# Patient Record
Sex: FEMALE | Race: OTHER | URBAN - METROPOLITAN AREA
[De-identification: names, ages, dates, MRNs, and addresses within clinical notes are randomized per-mention and may not be internally consistent; named-entity substitution may affect disease eponyms.]

---

## 2018-07-21 ENCOUNTER — HOSPITAL ENCOUNTER (OUTPATIENT)
Dept: LAB | Age: 23
Discharge: HOME | End: 2018-07-21

## 2018-07-21 ENCOUNTER — OFFICE VISIT (OUTPATIENT)
Dept: FAMILY MEDICINE CLINIC | Age: 23
End: 2018-07-21

## 2018-07-21 VITALS
HEART RATE: 64 BPM | SYSTOLIC BLOOD PRESSURE: 110 MMHG | BODY MASS INDEX: 20.55 KG/M2 | WEIGHT: 116 LBS | TEMPERATURE: 98.5 F | HEIGHT: 63 IN | DIASTOLIC BLOOD PRESSURE: 60 MMHG

## 2018-07-21 DIAGNOSIS — Z32.00 POSSIBLE PREGNANCY, NOT CONFIRMED: Primary | ICD-10-CM

## 2018-07-21 DIAGNOSIS — Z32.00 POSSIBLE PREGNANCY, NOT CONFIRMED: ICD-10-CM

## 2018-07-21 DIAGNOSIS — R51.9 DAILY HEADACHE: ICD-10-CM

## 2018-07-21 PROCEDURE — 84702 CHORIONIC GONADOTROPIN TEST: CPT | Performed by: PHYSICIAN ASSISTANT

## 2018-07-21 PROCEDURE — 84703 CHORIONIC GONADOTROPIN ASSAY: CPT | Performed by: PHYSICIAN ASSISTANT

## 2018-07-21 NOTE — PROGRESS NOTES
Assessment/Plan:    Diagnoses and all orders for this visit:    1. Possible pregnancy, not confirmed  -     BETA HCG, QT; Future  -     HCG QL SERUM; Future    2. Daily headache    No rx recommended until I review the lab results today- she states that with her first pregnancy she never had a pos UA HCG only serum HCG was positive  If her hcg is neg, then can do migraine medication  If her hcg is positive, then refer her to [facility] and start PNV  Go to ER if sxs worsen as it is Saturday so I can't get an ultrasound or STAT labs at the mobile clinic, she is smiling and looks well     Follow-up Disposition:  Return for pap appt . Ananda Knott PA-C  [address] expressed understanding of this plan. An AVS was printed and given to the patient.      ----------------------------------------------------------------------    Chief Complaint   Patient presents with    Headache     x2 months    Abdominal Pain       History of Present Illness:   here for possible early pregnancy due to fatigue, breast soreness and pelvic pressure. She has same partner currently as with her 2 yo son. She uses some form of depo that she buys at [employer], she last had it injected about 6 weeks ago. She had her first pregnancy while using similar medication. She has irregular periods and thinks that she had one this month but isn't sure  She does not have any vaginal discharge or pain with intercourse  She has daily headaches for the past 10 days. Noise really bothers her and she has to go away from everyone to lie down. Light does not bother her. Not having any serious nausea or vomiting with the headaches. She has not tried any medication. Before the past 10 days, she has had these headaches but they were usually once weekly    She denies any new stress in her life like new job or break up with partner etc       No past medical history on file.         Allergies no known allergies    Social History   Substance Use Topics    Smoking status: Never Smoker    Smokeless tobacco: Never Used    Alcohol use No       No family history on file. Physical Exam:     Visit Vitals    /60 (BP 1 Location: Right arm)    Pulse 64    Temp 98.5 °F (36.9 °C) (Oral)    Ht 5' 3.39\" (1.61 m)    Wt 116 lb (52.6 kg)    LMP 06/27/2018    BMI 20.3 kg/m2       A&Ox3  WDWN NAD  Respirations normal and non labored  Neuro CN 2-12 grossly intact  Pelvic exam bimanual only- no abnl lesions or discharge encountered. No CMT, no enlargement of her uterus.  Adnexal do not feel to be enlarged

## 2018-07-21 NOTE — PROGRESS NOTES
Patient seen for discharge with assistance from Emily Perkins, interpreter. We reviewed the AVS and the patient will go now to registration to schedule a PAP exam appointment. She will then wait in the gym to be called to the [facility] for labs.  Bebe Carroll RN

## 2018-07-21 NOTE — PATIENT INSTRUCTIONS
Dolor de edouard: Instrucciones de cuidado - [ Headache: Care Instructions ]  Instrucciones de cuidado    Los joe de edouard tienen muchas causas posibles. La mayoría de los joe de edouard no son señal de un problema más toribio y mejoran por sí solos. El tratamiento en el hogar podría ayudarlo a sentirse mejor con Rick Francis. El médico lo conway revisado minuciosamente, sean puede desarrollar problemas más tarde. Si nota algún problema o síntomas, busque tratamiento médico inmediatamente. La atención de seguimiento es elicia parte clave de estrada tratamiento y seguridad. Asegúrese de hacer y acudir a todas las citas, y llame a estrada médico si está teniendo problemas. También es elicia buena idea saber los resultados de lakisha exámenes y mantener elicia lista de los medicamentos que alyssa. ¿Cómo puede cuidarse en el [facility]? · No conduzca si ha tomado analgésicos (medicamentos para el dolor) recetados. · Descanse en un cuarto tranquilo y oscuro hasta que desaparezca el dolor de [place]. Cierre los ojos y trate de relajarse o dormirse. No bonnie la televisión ni fransisca. · Colóquese un paño frío y húmedo o Myrla Avani compresa fría sobre la yuan adolorida de 10 a 21 minutos cada vez. Póngase un paño patterson entre la compresa fría y la piel. · Utilice elicia toalla húmeda tibia o elicia almohadilla térmica ajustada a baja temperatura para relajar los músculos tensos del kia y los hombros.  · [place]ídale a alguien que le mike masajes suaves en el kia y los hombros.  · [place] los analgésicos exactamente barbra le fueron indicados. ¨ Si el médico le recetó un analgésico, tómelo según las indicaciones. ¨ Si no está tomando un analgésico recetado, pregúntele a estrada médico si puede bree juliet de [employer]. · Tenga cuidado de no bree analgésicos con mayor frecuencia que la permitida en las indicaciones porque los joe de edouard podrían empeorar o aparecer con mayor frecuencia elicia vez que el medicamento pierda estrada [place].   · Preste atención a los nuevos síntomas que aparecen con el dolor de [place], [place], debilidad o entumecimiento, cambios en la visión o confusión. Podrían ser señales de un problema más grave. Para prevenir los joe de edouard  · [employer] un diario de lakisha joe de edouard para que pueda averiguar qué los desencadena. Evitar los desencadenantes podría ayudar a prevenir los joe de [place]. Anote cuándo empieza cada dolor de [place], cuánto dura y cómo es el dolor (palpitante, juan, punzante o sordo). Anote cualquier otro síntoma que haya tenido con el dolor de [place], [place] náuseas, destellos de maria victoria o MONSERRAT, o sensibilidad a la maria victoria brillante o a los ruidos lubna. Anote si el dolor de edouard ocurrió cerca de estrada menstruación. Enumere todos los factores que pudieran claudio desencadenado el dolor de [place], barbra ciertos alimentos (chocolate, queso, vino) u olores, humo, luces brillantes, estrés o falta de sueño. · Encuentre maneras saludables de [facility]. Los joe de [place] son más comunes aleksandar o daryl después de un momento estresante. Tómese un tiempo para relajarse antes y después de hacer algo que le haya causado un dolor de edouard en el pasado. · Trate de mantener lakisha músculos relajados mediante elicia buena postura. Revise si tiene [employer] de la [place], la leslie, el kia y los hombros y trate de relajarlos. Cuando se siente en un escritorio, cambie de posición con frecuencia y estírese por 27 segundos cada hora. · Selina suficiente ejercicio y duerma bastante. · Coma en forma regular y merle. Largos períodos sin comer pueden provocar un dolor de edouard. · Regálese un masaje. Algunas personas encuentran que los masajes hechos con regularidad son [place] para aliviar la tensión. · Limite la cafeína. No hiale demasiado café, té ni sodas. Ralph no deje de consumir cafeína de repente, porque eso también puede provocarle joe de [place].   · Reduzca la tensión en los ojos a causa de la computadora parpadeando con frecuencia y apartando la mirada de la pantalla a menudo. Asegúrese de tener lentes adecuados y de que estrada monitor esté colocado de manera correcta, barbra a un brazo de distancia. · Busque ayuda si tiene depresión o ansiedad. Phyllis joe de [place] podrían relacionarse con estas afecciones. El tratamiento puede prevenir los joe de [place] y ayudar con los síntomas de ansiedad o depresión. ¿Cuándo debe pedir ayuda? Llame al 911 en cualquier momento que piense que puede necesitar atención de emergencia. Por ejemplo, llame si:    · Tiene señales de un ataque cerebral. Estas pueden incluir:  ¨ Parálisis, entumecimiento o debilidad repentinos en la leslie, el brazo o la pierna, sobre todo si ocurre en un solo lado del cuerpo. ¨ Cambios repentinos en la visión. ¨ Dificultades repentinas para hablar. ¨ Confusión repentina o dificultad para comprender frases sencillas. ¨ Problemas repentinos para caminar o mantener el equilibrio. ¨ Dolor de [place] intenso y repentino, distinto de los joe de edouard anteriores.    Llame a estrada médico ahora mismo o busque atención médica inmediata si:    · Tiene un dolor de Donnis Barrow o peor.     · Estrada dolor de edouard empeora mucho. ¿Dónde puede encontrar más información en inglés? Magdiel Marilee a [email or web address]-roverto.info/. Escriba D787 en la búsqueda para aprender más acerca de \"Dolor de eoduard: Instrucciones de cuidado - [ Headache: Care Instructions ]. \"  Revisado: 9 octubre, 2017  Versión del contenido: 11.7  © 8658-4095 [employer]. Las instrucciones de cuidado fueron adaptadas bajo licencia por [employer] (which disclaims liability or warranty for this information). Si usted tiene [place] afección médica o sobre estas instrucciones, siempre pregunte a estrada profesional de jose m. [employer], [employer] niega toda garantía o responsabilidad por estrada uso de esta información.

## 2018-07-22 LAB — HCG SERPL-ACNC: <1 MIU/ML (ref 0–6)

## 2018-07-30 ENCOUNTER — DOCUMENTATION ONLY (OUTPATIENT)
Dept: FAMILY MEDICINE CLINIC | Age: 23
End: 2018-07-30

## 2018-07-30 ENCOUNTER — TELEPHONE (OUTPATIENT)
Dept: FAMILY MEDICINE CLINIC | Age: 23
End: 2018-07-30

## 2018-07-30 NOTE — TELEPHONE ENCOUNTER
Returned pt call she was asking about lab results. Did not see notes from provider. Did note that hcg result was still \"in process\", explained to pt that we do not have results yet and reminded her of September appt.

## 2018-08-17 NOTE — TELEPHONE ENCOUNTER
Spoke to patient, gave her the negative hcg results and reminded her of the upcoming September 10 appt.

## 2022-06-28 ENCOUNTER — TRANSCRIBE ORDER (OUTPATIENT)
Dept: SCHEDULING | Age: 27
End: 2022-06-28

## 2022-06-28 DIAGNOSIS — N92.6 IRREGULAR PERIODS: Primary | ICD-10-CM

## 2022-06-28 DIAGNOSIS — N94.6 DYSMENORRHEA: ICD-10-CM

## 2023-04-21 DIAGNOSIS — N92.6 IRREGULAR PERIODS: Primary | ICD-10-CM

## 2023-04-21 DIAGNOSIS — N94.6 DYSMENORRHEA: ICD-10-CM

## 2023-04-24 DIAGNOSIS — N94.6 DYSMENORRHEA: ICD-10-CM

## 2023-04-24 DIAGNOSIS — N92.6 IRREGULAR PERIODS: Primary | ICD-10-CM

## 2023-05-12 ENCOUNTER — HOSPITAL ENCOUNTER (EMERGENCY)
Facility: HOSPITAL | Age: 28
Discharge: HOME | End: 2023-05-12
Attending: STUDENT IN AN ORGANIZED HEALTH CARE EDUCATION/TRAINING PROGRAM

## 2023-05-12 VITALS
HEIGHT: 63 IN | SYSTOLIC BLOOD PRESSURE: 110 MMHG | RESPIRATION RATE: 18 BRPM | BODY MASS INDEX: 20.14 KG/M2 | WEIGHT: 113.65 LBS | HEART RATE: 74 BPM | OXYGEN SATURATION: 100 % | TEMPERATURE: 97.8 F | DIASTOLIC BLOOD PRESSURE: 77 MMHG

## 2023-05-12 DIAGNOSIS — R10.30 LOWER ABDOMINAL PAIN: Primary | ICD-10-CM

## 2023-05-12 LAB
ALBUMIN SERPL-MCNC: 4.9 G/DL (ref 3.5–5.2)
ALBUMIN/GLOB SERPL: 1.5 (ref 1.1–2.2)
ALP SERPL-CCNC: 61 U/L (ref 35–104)
ALT SERPL-CCNC: 8 U/L (ref 10–35)
ANION GAP SERPL CALC-SCNC: 11 MMOL/L (ref 5–15)
APPEARANCE UR: CLEAR
AST SERPL-CCNC: 20 U/L (ref 10–35)
BACTERIA URNS QL MICRO: ABNORMAL /HPF
BASOPHILS # BLD: 0.1 K/UL (ref 0–1)
BASOPHILS NFR BLD: 1 % (ref 0–1)
BILIRUB SERPL-MCNC: 0.5 MG/DL (ref 0.2–1)
BILIRUB UR QL: NEGATIVE
BUN SERPL-MCNC: 10 MG/DL (ref 6–20)
BUN/CREAT SERPL: 15 (ref 12–20)
CALCIUM SERPL-MCNC: 9.6 MG/DL (ref 8.6–10)
CHLORIDE SERPL-SCNC: 107 MMOL/L (ref 98–107)
CO2 SERPL-SCNC: 25 MMOL/L (ref 22–29)
COLOR UR: ABNORMAL
COMMENT:: NORMAL
CREAT SERPL-MCNC: 0.68 MG/DL (ref 0.5–0.9)
DIFFERENTIAL METHOD BLD: ABNORMAL
EOSINOPHIL # BLD: 0.1 K/UL (ref 0–0.4)
EOSINOPHIL NFR BLD: 1 %
EPITH CASTS URNS QL MICRO: ABNORMAL /LPF
ERYTHROCYTE [DISTWIDTH] IN BLOOD BY AUTOMATED COUNT: 18.9 % (ref 11.5–14.5)
GLOBULIN SER CALC-MCNC: 3.3 G/DL (ref 2–4)
GLUCOSE SERPL-MCNC: 99 MG/DL (ref 65–100)
GLUCOSE UR STRIP.AUTO-MCNC: NEGATIVE MG/DL
HCG SERPL-ACNC: <1 MIU/ML
HCT VFR BLD AUTO: 34.9 % (ref 35–47)
HGB BLD-MCNC: 11 G/DL (ref 11.5–16)
HGB UR QL STRIP: ABNORMAL
IMM GRANULOCYTES # BLD AUTO: 0 K/UL (ref 0–0.04)
IMM GRANULOCYTES NFR BLD AUTO: 0 % (ref 0–0.5)
KETONES UR QL STRIP.AUTO: NEGATIVE MG/DL
LEUKOCYTE ESTERASE UR QL STRIP.AUTO: NEGATIVE
LIPASE SERPL-CCNC: 27 U/L (ref 13–60)
LYMPHOCYTES # BLD: 2.4 K/UL (ref 0.8–3.5)
LYMPHOCYTES NFR BLD: 41 % (ref 12–49)
MCH RBC QN AUTO: 23.8 PG (ref 26–34)
MCHC RBC AUTO-ENTMCNC: 31.5 G/DL (ref 30–36.5)
MCV RBC AUTO: 75.5 FL (ref 80–99)
MONOCYTES # BLD: 0.5 K/UL (ref 0–1)
MONOCYTES NFR BLD: 8 % (ref 5–13)
NEUTS SEG # BLD: 2.8 K/UL (ref 1.8–8)
NEUTS SEG NFR BLD: 49 % (ref 32–75)
NITRITE UR QL STRIP.AUTO: NEGATIVE
NRBC # BLD: 0 K/UL (ref 0–0.01)
NRBC BLD-RTO: 0 PER 100 WBC
PH UR STRIP: 7 (ref 5–8)
PLATELET # BLD AUTO: 238 K/UL (ref 150–400)
PMV BLD AUTO: 11.5 FL (ref 8.9–12.9)
POTASSIUM SERPL-SCNC: 3.9 MMOL/L (ref 3.5–5.1)
PROT SERPL-MCNC: 8.2 G/DL (ref 6.4–8.3)
PROT UR STRIP-MCNC: NEGATIVE MG/DL
RBC # BLD AUTO: 4.62 M/UL (ref 3.8–5.2)
RBC #/AREA URNS HPF: ABNORMAL /HPF
SODIUM SERPL-SCNC: 143 MMOL/L (ref 136–145)
SP GR UR REFRACTOMETRY: 1.01 (ref 1–1.03)
SPECIMEN HOLD: NORMAL
SPECIMEN HOLD: NORMAL
UROBILINOGEN UR QL STRIP.AUTO: 0.2 EU/DL (ref 0.2–1)
WBC # BLD AUTO: 5.8 K/UL (ref 3.6–11)
WBC URNS QL MICRO: ABNORMAL /HPF (ref 0–4)

## 2023-05-12 PROCEDURE — 85025 COMPLETE CBC W/AUTO DIFF WBC: CPT

## 2023-05-12 PROCEDURE — 81001 URINALYSIS AUTO W/SCOPE: CPT

## 2023-05-12 PROCEDURE — 36415 COLL VENOUS BLD VENIPUNCTURE: CPT

## 2023-05-12 PROCEDURE — 83690 ASSAY OF LIPASE: CPT

## 2023-05-12 PROCEDURE — 99283 EMERGENCY DEPT VISIT LOW MDM: CPT

## 2023-05-12 PROCEDURE — 84702 CHORIONIC GONADOTROPIN TEST: CPT

## 2023-05-12 PROCEDURE — 80053 COMPREHEN METABOLIC PANEL: CPT

## 2023-05-12 RX ORDER — DICYCLOMINE HYDROCHLORIDE 10 MG/1
10 CAPSULE ORAL 4 TIMES DAILY
Qty: 20 CAPSULE | Refills: 0 | Status: SHIPPED | OUTPATIENT
Start: 2023-05-12 | End: 2023-05-17

## 2023-05-12 ASSESSMENT — PAIN SCALES - GENERAL: PAINLEVEL_OUTOF10: 8

## 2023-05-12 ASSESSMENT — PAIN DESCRIPTION - LOCATION: LOCATION: HEAD

## 2023-05-12 ASSESSMENT — PAIN DESCRIPTION - DESCRIPTORS: DESCRIPTORS: ACHING

## 2023-05-12 ASSESSMENT — PAIN - FUNCTIONAL ASSESSMENT: PAIN_FUNCTIONAL_ASSESSMENT: 0-10

## 2023-05-12 NOTE — ED TRIAGE NOTES
[place] interpretor used in triage: [id number]  Session Code: 80588    Patient arrives with c/o headache, generalized abdominal pain, nausea, generalized body aches, chills, decreased appetite, generalized weakness. Reports dysuria and urinary frequency. Reports intermittent episodes of SOB, and elevated HR. States sx present for past 22 days. Denies chest pain, SOB in triage. No signs of respiratory distress.

## 2023-05-12 NOTE — CONSULTS
Session ID: [id number]  Request WD:[id number]  Language:Zambian  Status:Fulfilled  Agent IW:#[id number]  Agent Tez Faye

## 2023-05-12 NOTE — ED NOTES
Patient does not appear to be in any acute distress/shows no evidence of clinical instability at this time. Provider has reviewed discharge instructions with the patient/family. The patient/family verbalized understanding instructions as well as need for follow up for any further symptoms. Discharge papers given, education provided, and any questions answered.         Jose Drake RN  05/12/23 8396
Pt c/o bilateral low abd pain and heavy vaginal bleeding- passing clots. MD notified.      1100 . [facility]  05/12/23 3029
Yes

## 2023-05-27 NOTE — ED PROVIDER NOTES
[facility] EMERGENCY DEPT  EMERGENCY DEPARTMENT ENCOUNTER      Pt Name: Sandee Felty  MRN: [id number]  [place] 1995  Date of evaluation: 5/12/2023  Provider: Tong Gunter MD    CHIEF COMPLAINT       Chief Complaint   Patient presents with    Headache    Nausea    Fatigue    Dysuria         HISTORY OF PRESENT ILLNESS   (Location/Symptom, Timing/Onset, Context/Setting, Quality, Duration, Modifying Factors, Severity)  Note limiting factors. Patient is a 21-year-old female present emergency department complaining of headache, generalized abdominal pain, nausea chills decreased p.o. intake as well as weakness. Patient's also been experiencing dysuria with urinary frequency. Patient states that she has had the symptoms for approximately 3 weeks. The history is provided by the patient. The history is limited by a language barrier. Review of External Medical Records:     Nursing Notes were reviewed. REVIEW OF SYSTEMS    (2-9 systems for level 4, 10 or more for level 5)     Review of Systems    Except as noted above the remainder of the review of systems was reviewed and negative. PAST MEDICAL HISTORY   No past medical history on file. SURGICAL HISTORY     No past surgical history on file. CURRENT MEDICATIONS       Discharge Medication List as of 5/12/2023  5:25 PM          ALLERGIES     Patient has no known allergies. FAMILY HISTORY     No family history on file. SOCIAL HISTORY       Social History     Socioeconomic History    Marital status: Single           PHYSICAL EXAM    (up to 7 for level 4, 8 or more for level 5)     ED Triage Vitals [05/12/23 1307]   BP Temp Temp Source Pulse Respirations SpO2 Height Weight - Scale   119/80 97.8 °F (36.6 °C) Oral 74 18 100 % 5' 3\" (1.6 m) 113 lb 10.4 oz (51.5 kg)       Body mass index is 20.13 kg/m². Physical Exam  Vitals and nursing note reviewed. Constitutional:       Appearance: Normal appearance.    HENT: